# Patient Record
Sex: FEMALE | Race: WHITE | NOT HISPANIC OR LATINO | URBAN - METROPOLITAN AREA
[De-identification: names, ages, dates, MRNs, and addresses within clinical notes are randomized per-mention and may not be internally consistent; named-entity substitution may affect disease eponyms.]

---

## 2023-01-01 ENCOUNTER — NURSING HOME VISIT (OUTPATIENT)
Dept: POST ACUTE CARE | Facility: EXTERNAL LOCATION | Age: 78
End: 2023-01-01
Payer: MEDICARE

## 2023-01-01 DIAGNOSIS — S12.112D CLOSED NONDISPLACED ODONTOID FRACTURE WITH TYPE II MORPHOLOGY AND ROUTINE HEALING, SUBSEQUENT ENCOUNTER: ICD-10-CM

## 2023-01-01 DIAGNOSIS — R26.2 AMBULATORY DYSFUNCTION: ICD-10-CM

## 2023-01-01 DIAGNOSIS — I10 PRIMARY HYPERTENSION: ICD-10-CM

## 2023-01-01 DIAGNOSIS — I60.9 SUBARACHNOID HEMORRHAGE (MULTI): Primary | ICD-10-CM

## 2023-01-01 DIAGNOSIS — F10.10 ALCOHOL USE DISORDER, MILD, ABUSE: ICD-10-CM

## 2023-01-01 DIAGNOSIS — M62.838 MUSCLE SPASM: ICD-10-CM

## 2023-01-01 DIAGNOSIS — S12.100A: ICD-10-CM

## 2023-01-01 DIAGNOSIS — E43 SEVERE PROTEIN-CALORIE MALNUTRITION (MULTI): ICD-10-CM

## 2023-01-01 DIAGNOSIS — J44.1 COPD EXACERBATION (MULTI): ICD-10-CM

## 2023-01-01 DIAGNOSIS — F32.9 REACTIVE DEPRESSION: ICD-10-CM

## 2023-01-01 DIAGNOSIS — I10 HYPERTENSION, UNSPECIFIED TYPE: ICD-10-CM

## 2023-01-01 PROCEDURE — 99309 SBSQ NF CARE MODERATE MDM 30: CPT | Performed by: STUDENT IN AN ORGANIZED HEALTH CARE EDUCATION/TRAINING PROGRAM

## 2023-01-01 PROCEDURE — 99308 SBSQ NF CARE LOW MDM 20: CPT | Performed by: STUDENT IN AN ORGANIZED HEALTH CARE EDUCATION/TRAINING PROGRAM

## 2023-01-01 PROCEDURE — 99310 SBSQ NF CARE HIGH MDM 45: CPT | Performed by: STUDENT IN AN ORGANIZED HEALTH CARE EDUCATION/TRAINING PROGRAM

## 2023-01-01 PROCEDURE — 99306 1ST NF CARE HIGH MDM 50: CPT | Performed by: STUDENT IN AN ORGANIZED HEALTH CARE EDUCATION/TRAINING PROGRAM

## 2023-01-01 ASSESSMENT — ENCOUNTER SYMPTOMS
GASTROINTESTINAL NEGATIVE: 1
NEUROLOGICAL NEGATIVE: 1
CONSTITUTIONAL NEGATIVE: 1
RESPIRATORY NEGATIVE: 1
CONSTITUTIONAL NEGATIVE: 1
CARDIOVASCULAR NEGATIVE: 1
ARTHRALGIAS: 1
CARDIOVASCULAR NEGATIVE: 1
CARDIOVASCULAR NEGATIVE: 1
GASTROINTESTINAL NEGATIVE: 1
RESPIRATORY NEGATIVE: 1
NEUROLOGICAL NEGATIVE: 1
NEUROLOGICAL NEGATIVE: 1
CARDIOVASCULAR NEGATIVE: 1
PSYCHIATRIC NEGATIVE: 1
CARDIOVASCULAR NEGATIVE: 1
MUSCULOSKELETAL NEGATIVE: 1
PSYCHIATRIC NEGATIVE: 1
RESPIRATORY NEGATIVE: 1
NECK PAIN: 1
RESPIRATORY NEGATIVE: 1
CONSTITUTIONAL NEGATIVE: 1
GASTROINTESTINAL NEGATIVE: 1
NECK STIFFNESS: 1
RESPIRATORY NEGATIVE: 1
PSYCHIATRIC NEGATIVE: 1
NEUROLOGICAL NEGATIVE: 1
CARDIOVASCULAR NEGATIVE: 1
RESPIRATORY NEGATIVE: 1
NEUROLOGICAL NEGATIVE: 1
NEUROLOGICAL NEGATIVE: 1
CONSTITUTIONAL NEGATIVE: 1
GASTROINTESTINAL NEGATIVE: 1
CONSTITUTIONAL NEGATIVE: 1
GASTROINTESTINAL NEGATIVE: 1
RESPIRATORY NEGATIVE: 1
MUSCULOSKELETAL NEGATIVE: 1
CARDIOVASCULAR NEGATIVE: 1
MYALGIAS: 1
NEUROLOGICAL NEGATIVE: 1
NECK STIFFNESS: 1
PSYCHIATRIC NEGATIVE: 1
CARDIOVASCULAR NEGATIVE: 1
PSYCHIATRIC NEGATIVE: 1
CONSTITUTIONAL NEGATIVE: 1
MUSCULOSKELETAL NEGATIVE: 1
GASTROINTESTINAL NEGATIVE: 1
MUSCULOSKELETAL NEGATIVE: 1
GASTROINTESTINAL NEGATIVE: 1
BACK PAIN: 1
CONSTITUTIONAL NEGATIVE: 1
MYALGIAS: 1
MUSCULOSKELETAL NEGATIVE: 1
CONSTITUTIONAL NEGATIVE: 1
RESPIRATORY NEGATIVE: 1
MUSCULOSKELETAL NEGATIVE: 1
SLEEP DISTURBANCE: 1
PSYCHIATRIC NEGATIVE: 1
PSYCHIATRIC NEGATIVE: 1
NEUROLOGICAL NEGATIVE: 1
GASTROINTESTINAL NEGATIVE: 1

## 2023-03-06 PROBLEM — S12.100A C2 CERVICAL FRACTURE (MULTI): Status: ACTIVE | Noted: 2023-01-01

## 2023-03-06 PROBLEM — R26.2 AMBULATORY DYSFUNCTION: Status: ACTIVE | Noted: 2023-01-01

## 2023-03-06 PROBLEM — F10.10 ALCOHOL USE DISORDER, MILD, ABUSE: Status: ACTIVE | Noted: 2023-01-01

## 2023-03-06 PROBLEM — I10 HYPERTENSION: Status: ACTIVE | Noted: 2023-01-01

## 2023-03-06 PROBLEM — J44.1 COPD EXACERBATION (MULTI): Status: ACTIVE | Noted: 2023-01-01

## 2023-03-06 PROBLEM — I60.9 SUBARACHNOID HEMORRHAGE (MULTI): Status: ACTIVE | Noted: 2023-01-01

## 2023-03-06 NOTE — ASSESSMENT & PLAN NOTE
Recently admitted to the hospital, in cervical collar, overall stable encourage PT OT monitor neuro status has follow-up with neurosurgery

## 2023-03-06 NOTE — ASSESSMENT & PLAN NOTE
Recently admitted to trauma surgery, overall stable, follow-up with neurosurgery.  Continue PT OT monitor neurological exam

## 2023-03-06 NOTE — LETTER
Subjective  Patient ID: Lucía Elam is a 77 y.o. female with alcohol use disorder, hypertension, hyperlipidemia, seizure disorder who presents to [facility] for further rehabilitation and skilled therapy, patient recently admitted to the hospital status post fall, patient was noted to have a mechanical fall down 5-6 stairs.  Work-up showed a subarachnoid hemorrhage as well as comminuted type II odontoid fracture.  Patient was noted to be hemodynamically stable, did not require surgical intervention.  Patient deemed overall stable and was recommended discharge to skilled rehab facility, does have close follow-up with neurosurgery and specialist.  States overall is doing well at this time.  Working with therapy, is wondering about overall prognosis.  Denies any additional issues.    Review of Systems   Constitutional: Negative.    HENT: Negative.     Respiratory: Negative.     Cardiovascular: Negative.    Gastrointestinal: Negative.    Musculoskeletal: Negative.    Neurological: Negative.    Psychiatric/Behavioral: Negative.         Objective  Physical Exam  Constitutional:       General: She is not in acute distress.     Appearance: She is not ill-appearing.   HENT:      Mouth/Throat:      Mouth: Mucous membranes are moist.      Pharynx: Oropharynx is clear. No oropharyngeal exudate or posterior oropharyngeal erythema.   Eyes:      Pupils: Pupils are equal, round, and reactive to light.   Neck:      Comments: In C-collar, overall stable  Cardiovascular:      Rate and Rhythm: Normal rate and regular rhythm.      Heart sounds: No murmur heard.  Pulmonary:      Effort: Pulmonary effort is normal. No respiratory distress.      Breath sounds: No wheezing.   Abdominal:      General: Abdomen is flat. Bowel sounds are normal. There is no distension.      Palpations: Abdomen is soft.      Tenderness: There is no abdominal tenderness.   Musculoskeletal:         General: No tenderness. Normal range of motion.   Skin:      General: Skin is warm and dry.   Neurological:      General: No focal deficit present.      Mental Status: She is alert and oriented to person, place, and time. Mental status is at baseline.   Psychiatric:         Mood and Affect: Mood normal.         Assessment/Plan  Problem List Items Addressed This Visit          Nervous    Subarachnoid hemorrhage (CMS/HCC) - Primary     Recently admitted to trauma surgery, overall stable, follow-up with neurosurgery.  Continue PT OT monitor neurological exam            Circulatory    Hypertension       Musculoskeletal    C2 cervical fracture (CMS/HCC)     Recently admitted to the hospital, in cervical collar, overall stable encourage PT OT monitor neuro status has follow-up with neurosurgery            Other    Alcohol use disorder, mild, abuse     Was noted in the hospital, monitor for signs of withdrawals is on multivitamins.         Ambulatory dysfunction     PT OT encourage ambulation fall precautions.             Weekly labs encouraged follow-up with specialist, monitor, encourage PT OT monitor neuro status

## 2023-03-06 NOTE — PROGRESS NOTES
Subjective   Patient ID: Lucía Elam is a 77 y.o. female with alcohol use disorder, hypertension, hyperlipidemia, seizure disorder who presents to [place] for further rehabilitation and skilled therapy, patient recently admitted to the hospital status post fall, patient was noted to have a mechanical fall down 5-6 stairs.  Work-up showed a subarachnoid hemorrhage as well as comminuted type II odontoid fracture.  Patient was noted to be hemodynamically stable, did not require surgical intervention.  Patient deemed overall stable and was recommended discharge to skilled rehab facility, does have close follow-up with neurosurgery and specialist.  States overall is doing well at this time.  Working with therapy, is wondering about overall prognosis.  Denies any additional issues.    Review of Systems   Constitutional: Negative.    HENT: Negative.     Respiratory: Negative.     Cardiovascular: Negative.    Gastrointestinal: Negative.    Musculoskeletal: Negative.    Neurological: Negative.    Psychiatric/Behavioral: Negative.         Objective   Physical Exam  Constitutional:       General: She is not in acute distress.     Appearance: She is not ill-appearing.   HENT:      Mouth/Throat:      Mouth: Mucous membranes are moist.      Pharynx: Oropharynx is clear. No oropharyngeal exudate or posterior oropharyngeal erythema.   Eyes:      Pupils: Pupils are equal, round, and reactive to light.   Neck:      Comments: In C-collar, overall stable  Cardiovascular:      Rate and Rhythm: Normal rate and regular rhythm.      Heart sounds: No murmur heard.  Pulmonary:      Effort: Pulmonary effort is normal. No respiratory distress.      Breath sounds: No wheezing.   Abdominal:      General: Abdomen is flat. Bowel sounds are normal. There is no distension.      Palpations: Abdomen is soft.      Tenderness: There is no abdominal tenderness.   Musculoskeletal:         General: No tenderness. Normal range of motion.   Skin:      General: Skin is warm and dry.   Neurological:      General: No focal deficit present.      Mental Status: She is alert and oriented to person, place, and time. Mental status is at baseline.   Psychiatric:         Mood and Affect: Mood normal.         Assessment/Plan   Problem List Items Addressed This Visit          Nervous    Subarachnoid hemorrhage (CMS/HCC) - Primary     Recently admitted to trauma surgery, overall stable, follow-up with neurosurgery.  Continue PT OT monitor neurological exam            Circulatory    Hypertension       Musculoskeletal    C2 cervical fracture (CMS/HCC)     Recently admitted to the hospital, in cervical collar, overall stable encourage PT OT monitor neuro status has follow-up with neurosurgery            Other    Alcohol use disorder, mild, abuse     Was noted in the hospital, monitor for signs of withdrawals is on multivitamins.         Ambulatory dysfunction     PT OT encourage ambulation fall precautions.             Weekly labs encouraged follow-up with specialist, monitor, encourage PT OT monitor neuro status

## 2023-03-08 PROBLEM — F32.9 REACTIVE DEPRESSION: Status: ACTIVE | Noted: 2023-01-01

## 2023-03-08 NOTE — LETTER
Subjective  Patient ID: Lucía Elam is a 77 y.o. female who was seen on follow up. She regards that she is doing minimal movements with therapy. Has gotten out of bed into a chair. Is anxious, however, would be out of alochol withdrawal window. Is unsure about future. Regards no other complaints. Pain present in neck, however stable.      Review of Systems   Constitutional: Negative.    HENT: Negative.     Respiratory: Negative.     Cardiovascular: Negative.    Gastrointestinal: Negative.    Musculoskeletal: Negative.    Neurological: Negative.    Psychiatric/Behavioral: Negative.         Objective  Physical Exam  Constitutional:       General: She is not in acute distress.     Appearance: She is not ill-appearing.      Comments: In C collar   HENT:      Mouth/Throat:      Mouth: Mucous membranes are moist.      Pharynx: Oropharynx is clear. No oropharyngeal exudate or posterior oropharyngeal erythema.   Eyes:      Pupils: Pupils are equal, round, and reactive to light.   Cardiovascular:      Rate and Rhythm: Normal rate and regular rhythm.      Heart sounds: No murmur heard.  Pulmonary:      Effort: Pulmonary effort is normal. No respiratory distress.      Breath sounds: No wheezing.   Abdominal:      General: Abdomen is flat. Bowel sounds are normal. There is no distension.      Palpations: Abdomen is soft.      Tenderness: There is no abdominal tenderness.   Musculoskeletal:         General: No tenderness. Normal range of motion.   Skin:     General: Skin is warm and dry.   Neurological:      General: No focal deficit present.      Mental Status: She is alert and oriented to person, place, and time. Mental status is at baseline.   Psychiatric:      Comments: Dec mood, flat affect         Assessment/Plan  Problem List Items Addressed This Visit          Nervous    Subarachnoid hemorrhage (CMS/HCC) - Primary       Musculoskeletal    C2 cervical fracture (CMS/HCC)       Other    Alcohol use disorder, mild, abuse     Ambulatory dysfunction    Reactive depression     Suspected, noticed dec mood, inc SSRI, monitor symptoms consider psych eval             Monitor mentation, fu on labs, encourage continue PT/OT, staff updated

## 2023-03-15 PROBLEM — M62.838 MUSCLE SPASM: Status: ACTIVE | Noted: 2023-01-01

## 2023-03-15 NOTE — LETTER
Patient: Lucía Elam  : 1945    Encounter Date: 03/15/2023    Subjective  Patient ID: Lucía Elam is a 77 y.o. female.    Pt seen resting in bed. Mood has been down, she reports that she is working with therapy but is still in some pain. Does not have much of an appetite. Pain is stable but is intermittently poor. Regards no additional issues.         Review of Systems   Constitutional: Negative.    HENT: Negative.     Respiratory: Negative.     Cardiovascular: Negative.    Gastrointestinal: Negative.    Musculoskeletal:  Positive for arthralgias, myalgias, neck pain and neck stiffness.   Neurological: Negative.    Psychiatric/Behavioral: Negative.         Objective vitals reviewed via EMR  Physical Exam  Vitals and nursing note reviewed.   Constitutional:       General: She is not in acute distress.     Appearance: She is not ill-appearing.      Comments: In C collar   HENT:      Mouth/Throat:      Mouth: Mucous membranes are moist.      Pharynx: Oropharynx is clear. No oropharyngeal exudate or posterior oropharyngeal erythema.   Eyes:      Pupils: Pupils are equal, round, and reactive to light.   Cardiovascular:      Rate and Rhythm: Normal rate and regular rhythm.      Heart sounds: No murmur heard.  Pulmonary:      Effort: Pulmonary effort is normal. No respiratory distress.      Breath sounds: No wheezing.   Abdominal:      General: Abdomen is flat. Bowel sounds are normal. There is no distension.      Palpations: Abdomen is soft.      Tenderness: There is no abdominal tenderness.   Musculoskeletal:         General: Normal range of motion.      Cervical back: Tenderness present.   Skin:     General: Skin is warm and dry.   Neurological:      General: No focal deficit present.      Mental Status: She is alert and oriented to person, place, and time. Mental status is at baseline.   Psychiatric:         Mood and Affect: Mood normal.         Assessment/Plan  Diagnoses and all orders for this  visit:  Subarachnoid hemorrhage (CMS/[facility])  Hypertension, unspecified type  Closed nondisplaced odontoid fracture with type II morphology and routine healing, subsequent encounter  Alcohol use disorder, mild, abuse  Ambulatory dysfunction  Reactive depression  Muscle spasm      Pt seen and evaluated, has been having poor appetite, weight is already poor. Mood has also been decreased since admission. Will consult psych, pt already on multiple mood modulator medications. Dietician eval, monitor weight. Called family for update, however, message was left, will continue to follow up. May consider muscle relaxers or stronger pain medications, however pt is poor candidate with history of alcohol use. Supportive care, weekly labs    >30 minutes spent in care of patient    Neeraj Mena DO  3/15/23      Electronically Signed By: Neeraj Mena DO   3/15/23  9:22 PM

## 2023-03-16 NOTE — PROGRESS NOTES
Subjective   Patient ID: Lucía Elam is a 77 y.o. female.    Pt seen resting in bed. Mood has been down, she reports that she is working with therapy but is still in some pain. Does not have much of an appetite. Pain is stable but is intermittently poor. Regards no additional issues.         Review of Systems   Constitutional: Negative.    HENT: Negative.     Respiratory: Negative.     Cardiovascular: Negative.    Gastrointestinal: Negative.    Musculoskeletal:  Positive for arthralgias, myalgias, neck pain and neck stiffness.   Neurological: Negative.    Psychiatric/Behavioral: Negative.         Objective  vitals reviewed via EMR  Physical Exam  Vitals and nursing note reviewed.   Constitutional:       General: She is not in acute distress.     Appearance: She is not ill-appearing.      Comments: In C collar   HENT:      Mouth/Throat:      Mouth: Mucous membranes are moist.      Pharynx: Oropharynx is clear. No oropharyngeal exudate or posterior oropharyngeal erythema.   Eyes:      Pupils: Pupils are equal, round, and reactive to light.   Cardiovascular:      Rate and Rhythm: Normal rate and regular rhythm.      Heart sounds: No murmur heard.  Pulmonary:      Effort: Pulmonary effort is normal. No respiratory distress.      Breath sounds: No wheezing.   Abdominal:      General: Abdomen is flat. Bowel sounds are normal. There is no distension.      Palpations: Abdomen is soft.      Tenderness: There is no abdominal tenderness.   Musculoskeletal:         General: Normal range of motion.      Cervical back: Tenderness present.   Skin:     General: Skin is warm and dry.   Neurological:      General: No focal deficit present.      Mental Status: She is alert and oriented to person, place, and time. Mental status is at baseline.   Psychiatric:         Mood and Affect: Mood normal.         Assessment/Plan   Diagnoses and all orders for this visit:  Subarachnoid hemorrhage (CMS/[facility])  Hypertension, unspecified  type  Closed nondisplaced odontoid fracture with type II morphology and routine healing, subsequent encounter  Alcohol use disorder, mild, abuse  Ambulatory dysfunction  Reactive depression  Muscle spasm      Pt seen and evaluated, has been having poor appetite, weight is already poor. Mood has also been decreased since admission. Will consult psych, pt already on multiple mood modulator medications. Dietician eval, monitor weight. Called family for update, however, message was left, will continue to follow up. May consider muscle relaxers or stronger pain medications, however pt is poor candidate with history of alcohol use. Supportive care, weekly labs    >30 minutes spent in care of patient    Neeraj Mena DO  3/15/23

## 2023-03-17 NOTE — LETTER
Patient: Lucía Elam  : 1945    Encounter Date: 2023    Subjective  Patient ID: Lucía Elam is a 77 y.o. female.    Patient seen on routine evaluation. Continues to have a poor appetitie, she reports that she is having muscle spasms and difficult with the C-collar. Would like to go home. Discussed with nutrition about weight loss and poor eating habits. Pt regards no additional issues, working with therapy.         Review of Systems   Constitutional: Negative.    HENT: Negative.          Neck pain, and muscle spasms   Respiratory: Negative.     Cardiovascular: Negative.    Gastrointestinal: Negative.    Musculoskeletal: Negative.    Neurological: Negative.    Psychiatric/Behavioral: Negative.         Objective  Physical Exam  Constitutional:       General: She is not in acute distress.     Appearance: She is not ill-appearing.   HENT:      Mouth/Throat:      Mouth: Mucous membranes are moist.      Pharynx: Oropharynx is clear. No oropharyngeal exudate or posterior oropharyngeal erythema.   Eyes:      Pupils: Pupils are equal, round, and reactive to light.   Cardiovascular:      Rate and Rhythm: Normal rate and regular rhythm.      Heart sounds: No murmur heard.  Pulmonary:      Effort: Pulmonary effort is normal. No respiratory distress.      Breath sounds: No wheezing.   Abdominal:      General: Abdomen is flat. Bowel sounds are normal. There is no distension.      Palpations: Abdomen is soft.      Tenderness: There is no abdominal tenderness.   Musculoskeletal:         General: No tenderness. Normal range of motion.   Skin:     General: Skin is warm and dry.   Neurological:      General: No focal deficit present.      Mental Status: She is alert and oriented to person, place, and time. Mental status is at baseline.   Psychiatric:      Comments: Flat affect, dec mood         Assessment/Plan  Diagnoses and all orders for this visit:  Subarachnoid hemorrhage (CMS/[facility])  Primary hypertension  Closed  nondisplaced odontoid fracture with type II morphology and routine healing, subsequent encounter  Muscle spasm  Alcohol use disorder, mild, abuse  Reactive depression    Patient seen and evaluated, overall poor affect, encourage nutrition supplements, consult to psych, potentially can wean off Wellbutrin and initiate Remeron. Conisder palliative consult pending clinical course, routine labs, supportive care    Neeraj Mena DO  3/17/23        Electronically Signed By: Neeraj Mena DO   3/17/23  9:25 PM

## 2023-03-18 NOTE — PROGRESS NOTES
Subjective   Patient ID: Lucía Elam is a 77 y.o. female.    Patient seen on routine evaluation. Continues to have a poor appetitie, she reports that she is having muscle spasms and difficult with the C-collar. Would like to go home. Discussed with nutrition about weight loss and poor eating habits. Pt regards no additional issues, working with therapy.         Review of Systems   Constitutional: Negative.    HENT: Negative.          Neck pain, and muscle spasms   Respiratory: Negative.     Cardiovascular: Negative.    Gastrointestinal: Negative.    Musculoskeletal: Negative.    Neurological: Negative.    Psychiatric/Behavioral: Negative.         Objective   Physical Exam  Constitutional:       General: She is not in acute distress.     Appearance: She is not ill-appearing.   HENT:      Mouth/Throat:      Mouth: Mucous membranes are moist.      Pharynx: Oropharynx is clear. No oropharyngeal exudate or posterior oropharyngeal erythema.   Eyes:      Pupils: Pupils are equal, round, and reactive to light.   Cardiovascular:      Rate and Rhythm: Normal rate and regular rhythm.      Heart sounds: No murmur heard.  Pulmonary:      Effort: Pulmonary effort is normal. No respiratory distress.      Breath sounds: No wheezing.   Abdominal:      General: Abdomen is flat. Bowel sounds are normal. There is no distension.      Palpations: Abdomen is soft.      Tenderness: There is no abdominal tenderness.   Musculoskeletal:         General: No tenderness. Normal range of motion.   Skin:     General: Skin is warm and dry.   Neurological:      General: No focal deficit present.      Mental Status: She is alert and oriented to person, place, and time. Mental status is at baseline.   Psychiatric:      Comments: Flat affect, dec mood         Assessment/Plan   Diagnoses and all orders for this visit:  Subarachnoid hemorrhage (CMS/[facility])  Primary hypertension  Closed nondisplaced odontoid fracture with type II morphology and routine  healing, subsequent encounter  Muscle spasm  Alcohol use disorder, mild, abuse  Reactive depression    Patient seen and evaluated, overall poor affect, encourage nutrition supplements, consult to psych, potentially can wean off Wellbutrin and initiate Remeron. Moe palliative consult pending clinical course, routine labs, supportive care    Neeraj Mena DO  3/17/23

## 2023-03-20 NOTE — LETTER
Patient: Lucía Elam  : 1945    Encounter Date: 2023    Daily Progress Note    Subjective  Patient ID: Lucía Elam is a 77 y.o. female who was seen for further evaluation. Feels as if her pain has improved tremendously with the initiation of low dose muscle relaxers. States that she would like to go home. Still weak and is losing weight. Needs a referral to a new PCP as well. Did have a witnessed mechanical fall today where she did hit her head. C collar in place at time of fall, neurochecks normal, however, did hit her head. Discussed with staff and plan to send to hospital for further evaluation.     Review of Systems   Constitutional: Negative.    HENT: Negative.     Respiratory: Negative.     Cardiovascular: Negative.    Gastrointestinal: Negative.    Musculoskeletal:  Positive for back pain, myalgias and neck stiffness.        Fall   Neurological: Negative.    Psychiatric/Behavioral: Negative.         Objective vitals reviewed via facility EMR  Physical Exam  Constitutional:       General: She is not in acute distress.     Appearance: She is not ill-appearing.   HENT:      Mouth/Throat:      Mouth: Mucous membranes are moist.      Pharynx: Oropharynx is clear. No oropharyngeal exudate or posterior oropharyngeal erythema.   Eyes:      Pupils: Pupils are equal, round, and reactive to light.   Cardiovascular:      Rate and Rhythm: Normal rate and regular rhythm.      Heart sounds: No murmur heard.  Pulmonary:      Effort: Pulmonary effort is normal. No respiratory distress.      Breath sounds: No wheezing.   Abdominal:      General: Abdomen is flat. Bowel sounds are normal. There is no distension.      Palpations: Abdomen is soft.      Tenderness: There is no abdominal tenderness.   Musculoskeletal:         General: No tenderness. Normal range of motion.   Skin:     General: Skin is warm and dry.   Neurological:      General: No focal deficit present.      Mental Status: She is alert and oriented  to person, place, and time. Mental status is at baseline.   Psychiatric:         Mood and Affect: Mood normal.         Assessment/Plan  Problem List Items Addressed This Visit          Nervous    Subarachnoid hemorrhage (CMS/HCC) - Primary       Circulatory    Hypertension       Musculoskeletal    C2 cervical fracture (CMS/HCC)    Muscle spasm       Other    Alcohol use disorder, mild, abuse    Ambulatory dysfunction    Reactive depression        Encourage continue PT/OT, continue pain control. Still is weak, advised continued PT/OT as patient would benefit from further care in a skilled facility. Awaiting psych eval due to weight loss. Patient noted to have witnessed fall where she hit her head. Did not lose consciousness. Due to recent neck fracture, will send patient to ED for further evaluation. Staff and patient updated    >40 minutes spent in management of patient  Neeraj Mena DO  3/20/23      Electronically Signed By: Neeraj Mena DO   3/20/23 11:22 PM

## 2023-03-21 NOTE — PROGRESS NOTES
Daily Progress Note    Subjective   Patient ID: Lucía Elam is a 77 y.o. female who was seen for further evaluation. Feels as if her pain has improved tremendously with the initiation of low dose muscle relaxers. States that she would like to go home. Still weak and is losing weight. Needs a referral to a new PCP as well. Did have a witnessed mechanical fall today where she did hit her head. C collar in place at time of fall, neurochecks normal, however, did hit her head. Discussed with staff and plan to send to hospital for further evaluation.     Review of Systems   Constitutional: Negative.    HENT: Negative.     Respiratory: Negative.     Cardiovascular: Negative.    Gastrointestinal: Negative.    Musculoskeletal:  Positive for back pain, myalgias and neck stiffness.        Fall   Neurological: Negative.    Psychiatric/Behavioral: Negative.         Objective  vitals reviewed via facility EMR  Physical Exam  Constitutional:       General: She is not in acute distress.     Appearance: She is not ill-appearing.   HENT:      Mouth/Throat:      Mouth: Mucous membranes are moist.      Pharynx: Oropharynx is clear. No oropharyngeal exudate or posterior oropharyngeal erythema.   Eyes:      Pupils: Pupils are equal, round, and reactive to light.   Cardiovascular:      Rate and Rhythm: Normal rate and regular rhythm.      Heart sounds: No murmur heard.  Pulmonary:      Effort: Pulmonary effort is normal. No respiratory distress.      Breath sounds: No wheezing.   Abdominal:      General: Abdomen is flat. Bowel sounds are normal. There is no distension.      Palpations: Abdomen is soft.      Tenderness: There is no abdominal tenderness.   Musculoskeletal:         General: No tenderness. Normal range of motion.   Skin:     General: Skin is warm and dry.   Neurological:      General: No focal deficit present.      Mental Status: She is alert and oriented to person, place, and time. Mental status is at baseline.    Psychiatric:         Mood and Affect: Mood normal.         Assessment/Plan   Problem List Items Addressed This Visit          Nervous    Subarachnoid hemorrhage (CMS/HCC) - Primary       Circulatory    Hypertension       Musculoskeletal    C2 cervical fracture (CMS/HCC)    Muscle spasm       Other    Alcohol use disorder, mild, abuse    Ambulatory dysfunction    Reactive depression        Encourage continue PT/OT, continue pain control. Still is weak, advised continued PT/OT as patient would benefit from further care in a skilled facility. Awaiting psych eval due to weight loss. Patient noted to have witnessed fall where she hit her head. Did not lose consciousness. Due to recent neck fracture, will send patient to ED for further evaluation. Staff and patient updated    >40 minutes spent in management of patient  Neeraj Mena DO  3/20/23

## 2023-03-22 NOTE — LETTER
Patient: Lucía Elam  : 1945    Encounter Date: 2023    Subjective  Patient ID: Lucía Elam is a 77 y.o. female.    Pt seen resting in bed. Overall stable, pain still well controlled. Workup up fall was negative. She reports that she would like to go home if possible. Still has flat affect however is on multiple medications to help modulate mood. Lexapro has been increased during stay. Encourage continued PT/OT. Discussed with staff.         Review of Systems   Constitutional: Negative.    HENT: Negative.     Respiratory: Negative.     Cardiovascular: Negative.    Gastrointestinal: Negative.    Musculoskeletal: Negative.    Neurological: Negative.    Psychiatric/Behavioral:  Positive for sleep disturbance.         Dec mood       ObjectiveVitals Reviewed via facility EMR   Physical Exam  Constitutional:       General: She is not in acute distress.     Appearance: She is not ill-appearing.      Comments: Flat affect, lies in bed all day   HENT:      Mouth/Throat:      Mouth: Mucous membranes are moist.      Pharynx: Oropharynx is clear. No oropharyngeal exudate or posterior oropharyngeal erythema.   Eyes:      Pupils: Pupils are equal, round, and reactive to light.   Cardiovascular:      Rate and Rhythm: Normal rate and regular rhythm.      Heart sounds: No murmur heard.  Pulmonary:      Effort: Pulmonary effort is normal. No respiratory distress.      Breath sounds: No wheezing.   Abdominal:      General: Abdomen is flat. Bowel sounds are normal. There is no distension.      Palpations: Abdomen is soft.      Tenderness: There is no abdominal tenderness.   Musculoskeletal:         General: No tenderness. Normal range of motion.   Skin:     General: Skin is warm and dry.   Neurological:      General: No focal deficit present.      Mental Status: She is alert and oriented to person, place, and time. Mental status is at baseline.   Psychiatric:      Comments: Flat, dec mood, poor appetite          Assessment/Plan  Diagnoses and all orders for this visit:  Subarachnoid hemorrhage (CMS/HCC)  Primary hypertension  Closed nondisplaced odontoid fracture with type II morphology and routine healing, subsequent encounter  Muscle spasm  Alcohol use disorder, mild, abuse  Ambulatory dysfunction  Reactive depression    Continue to encourage supplements and working with therapy. Discussed with staff that she would benefit from psych eval while in facility. Check Mag and Phos at next labs due to alcohol use. Workup negative from fall, continue supportive care and fall precuations.    Neeraj Mena DO   3/22/23      Electronically Signed By: Neeraj Mena DO   3/23/23  8:40 PM

## 2023-03-23 PROBLEM — E43 SEVERE PROTEIN-CALORIE MALNUTRITION (MULTI): Status: ACTIVE | Noted: 2023-01-01

## 2023-03-24 NOTE — PROGRESS NOTES
Subjective   Patient ID: Lucía Elam is a 77 y.o. female.    Pt seen resting in bed. Overall stable, pain still well controlled. Workup up fall was negative. She reports that she would like to go home if possible. Still has flat affect however is on multiple medications to help modulate mood. Lexapro has been increased during stay. Encourage continued PT/OT. Discussed with staff.         Review of Systems   Constitutional: Negative.    HENT: Negative.     Respiratory: Negative.     Cardiovascular: Negative.    Gastrointestinal: Negative.    Musculoskeletal: Negative.    Neurological: Negative.    Psychiatric/Behavioral:  Positive for sleep disturbance.         Dec mood       Objective Vitals Reviewed via facility EMR   Physical Exam  Constitutional:       General: She is not in acute distress.     Appearance: She is not ill-appearing.      Comments: Flat affect, lies in bed all day   HENT:      Mouth/Throat:      Mouth: Mucous membranes are moist.      Pharynx: Oropharynx is clear. No oropharyngeal exudate or posterior oropharyngeal erythema.   Eyes:      Pupils: Pupils are equal, round, and reactive to light.   Cardiovascular:      Rate and Rhythm: Normal rate and regular rhythm.      Heart sounds: No murmur heard.  Pulmonary:      Effort: Pulmonary effort is normal. No respiratory distress.      Breath sounds: No wheezing.   Abdominal:      General: Abdomen is flat. Bowel sounds are normal. There is no distension.      Palpations: Abdomen is soft.      Tenderness: There is no abdominal tenderness.   Musculoskeletal:         General: No tenderness. Normal range of motion.   Skin:     General: Skin is warm and dry.   Neurological:      General: No focal deficit present.      Mental Status: She is alert and oriented to person, place, and time. Mental status is at baseline.   Psychiatric:      Comments: Flat, dec mood, poor appetite         Assessment/Plan   Diagnoses and all orders for this visit:  Subarachnoid  hemorrhage (CMS/HCC)  Primary hypertension  Closed nondisplaced odontoid fracture with type II morphology and routine healing, subsequent encounter  Muscle spasm  Alcohol use disorder, mild, abuse  Ambulatory dysfunction  Reactive depression    Continue to encourage supplements and working with therapy. Discussed with staff that she would benefit from psych eval while in facility. Check Mag and Phos at next labs due to alcohol use. Workup negative from fall, continue supportive care and fall precuations.    Neeraj Mena DO   3/22/23

## 2023-03-28 NOTE — LETTER
Patient: Lucía Elam  : 1945    Encounter Date: 2023    History and Physical    Subjective  Patient ID: Lucía Elam is a 77 y.o. female.    Patient is a 77-year-old female who presents as a readmission to a skilled nursing facility.  Past medical history includes hypertension, hyperlipidemia, history of alcohol use, anxiety, tobacco abuse.  Patient recently had an unwitnessed fall.  He was sent to the hospital for further evaluation due to known knowledge of subarachnoid hemorrhage as well as C2 fracture.  Patient C2 fracture overall stable however did have redemonstration of subarachnoid hemorrhage.  Patient was monitored closely in the hospital, was seen by neurosurgery did not require a surgical intervention.  Patient was discharged back to SNF in stable condition.  Patient reports that overall she is stable however still is having some neck pain.  Appetite is still poor.  She denies any additional issues or concerns.  On review of the chart, patient is still full code. No addititional issues        Review of Systems   Constitutional: Negative.    HENT: Negative.     Respiratory: Negative.     Cardiovascular: Negative.    Gastrointestinal: Negative.    Musculoskeletal: Negative.    Neurological: Negative.         Neck pain, neuriopathy   Psychiatric/Behavioral: Negative.         Objective  Physical Exam  Constitutional:       General: She is not in acute distress.     Appearance: She is not ill-appearing.      Comments: C-collar   HENT:      Mouth/Throat:      Mouth: Mucous membranes are moist.      Pharynx: Oropharynx is clear. No oropharyngeal exudate or posterior oropharyngeal erythema.   Eyes:      Pupils: Pupils are equal, round, and reactive to light.   Cardiovascular:      Rate and Rhythm: Normal rate and regular rhythm.      Heart sounds: No murmur heard.  Pulmonary:      Effort: Pulmonary effort is normal. No respiratory distress.      Breath sounds: No wheezing.   Abdominal:       General: Abdomen is flat. Bowel sounds are normal. There is no distension.      Palpations: Abdomen is soft.      Tenderness: There is no abdominal tenderness.   Musculoskeletal:         General: No tenderness. Normal range of motion.   Skin:     General: Skin is warm and dry.   Neurological:      General: No focal deficit present.      Mental Status: She is alert and oriented to person, place, and time. Mental status is at baseline.   Psychiatric:         Mood and Affect: Mood normal.      Comments: Flat affect         Assessment/Plan  Diagnoses and all orders for this visit:  Subarachnoid hemorrhage (CMS/HCC)  COPD exacerbation (CMS/HCC)  Primary hypertension  Closed nondisplaced odontoid fracture with type II morphology and routine healing, subsequent encounter  Severe protein-calorie malnutrition (CMS/HCC)  Muscle spasm  Alcohol use disorder, mild, abuse  Reactive depression  Ambulatory dysfunction    Patient seen and evaluated, hospital course reviewed.  Patient did have a redemonstration of some arachnoid hemorrhage Did not require any surgical intervention.  PT OT, and frequent falls will need to be very judicious with pain control.  We will need to closely monitor progression outpatient as she has had very poor progression with therapy.  Continue supportive care.  Weekly labs.  Fall precautions while in facility.  Head of bed elevation.    >45 minutes spent in management of patient    Neeraj Mena DO      Electronically Signed By: Neeraj Mena DO   3/29/23  9:04 PM

## 2023-03-30 NOTE — LETTER
Patient: Lucía Ealm  : 1945    Encounter Date: 2023    Subjective  Patient ID: Lucía Elam is a 77 y.o. female.    Patient seen on routine evaluation. Overall doing well, neck pain is much improved and she has not fallen since being back at the facility. She feels as if her strength is back at baseline however, has started noticing that her mentation is not to the level where it was at previously. Feels as if this started secondary to the injury. She regards no additional issues at this time.         Review of Systems   Constitutional: Negative.    HENT: Negative.     Respiratory: Negative.     Cardiovascular: Negative.    Gastrointestinal: Negative.    Musculoskeletal: Negative.    Neurological: Negative.         Forgetful   Psychiatric/Behavioral: Negative.         ObjectiveVitals Reviewed via facility EMR   Physical Exam  Constitutional:       General: She is not in acute distress.     Comments: Malnourished, elderly, in c collar   HENT:      Mouth/Throat:      Mouth: Mucous membranes are moist.      Pharynx: Oropharynx is clear. No oropharyngeal exudate or posterior oropharyngeal erythema.   Eyes:      Pupils: Pupils are equal, round, and reactive to light.   Cardiovascular:      Rate and Rhythm: Normal rate and regular rhythm.      Heart sounds: No murmur heard.  Pulmonary:      Effort: Pulmonary effort is normal. No respiratory distress.      Breath sounds: No wheezing.   Abdominal:      General: Abdomen is flat. There is no distension.   Musculoskeletal:         General: No tenderness. Normal range of motion.   Skin:     General: Skin is warm and dry.   Neurological:      General: No focal deficit present.      Mental Status: She is alert and oriented to person, place, and time.   Psychiatric:         Mood and Affect: Mood normal.         Assessment/Plan  Diagnoses and all orders for this visit:  Subarachnoid hemorrhage (CMS/[facility])  Primary hypertension  Closed nondisplaced odontoid fracture  with type II morphology and routine healing, subsequent encounter  Muscle spasm  Severe protein-calorie malnutrition (CMS/HCC)  Alcohol use disorder, mild, abuse  Ambulatory dysfunction      Patient seen and evaluated, physically patient is overall doing well and likely close to discharge back home with [facility] however still struggling mentally. Suspect that this is secondary to underlying sequela of injury. Continue supportive care, will need fu with neuro as outpatient. Avoid sedating meds, no additional issues.       Electronically Signed By: Neeraj Mena DO   3/31/23  2:27 PM

## 2023-03-30 NOTE — PROGRESS NOTES
History and Physical    Subjective   Patient ID: Lucía Elam is a 77 y.o. female.    Patient is a 77-year-old female who presents as a readmission to a skilled nursing facility.  Past medical history includes hypertension, hyperlipidemia, history of alcohol use, anxiety, tobacco abuse.  Patient recently had an unwitnessed fall.  He was sent to the hospital for further evaluation due to known knowledge of subarachnoid hemorrhage as well as C2 fracture.  Patient C2 fracture overall stable however did have redemonstration of subarachnoid hemorrhage.  Patient was monitored closely in the hospital, was seen by neurosurgery did not require a surgical intervention.  Patient was discharged back to SNF in stable condition.  Patient reports that overall she is stable however still is having some neck pain.  Appetite is still poor.  She denies any additional issues or concerns.  On review of the chart, patient is still full code. No addititional issues        Review of Systems   Constitutional: Negative.    HENT: Negative.     Respiratory: Negative.     Cardiovascular: Negative.    Gastrointestinal: Negative.    Musculoskeletal: Negative.    Neurological: Negative.         Neck pain, neuriopathy   Psychiatric/Behavioral: Negative.         Objective   Physical Exam  Constitutional:       General: She is not in acute distress.     Appearance: She is not ill-appearing.      Comments: C-collar   HENT:      Mouth/Throat:      Mouth: Mucous membranes are moist.      Pharynx: Oropharynx is clear. No oropharyngeal exudate or posterior oropharyngeal erythema.   Eyes:      Pupils: Pupils are equal, round, and reactive to light.   Cardiovascular:      Rate and Rhythm: Normal rate and regular rhythm.      Heart sounds: No murmur heard.  Pulmonary:      Effort: Pulmonary effort is normal. No respiratory distress.      Breath sounds: No wheezing.   Abdominal:      General: Abdomen is flat. Bowel sounds are normal. There is no distension.       Palpations: Abdomen is soft.      Tenderness: There is no abdominal tenderness.   Musculoskeletal:         General: No tenderness. Normal range of motion.   Skin:     General: Skin is warm and dry.   Neurological:      General: No focal deficit present.      Mental Status: She is alert and oriented to person, place, and time. Mental status is at baseline.   Psychiatric:         Mood and Affect: Mood normal.      Comments: Flat affect         Assessment/Plan   Diagnoses and all orders for this visit:  Subarachnoid hemorrhage (CMS/HCC)  COPD exacerbation (CMS/HCC)  Primary hypertension  Closed nondisplaced odontoid fracture with type II morphology and routine healing, subsequent encounter  Severe protein-calorie malnutrition (CMS/HCC)  Muscle spasm  Alcohol use disorder, mild, abuse  Reactive depression  Ambulatory dysfunction    Patient seen and evaluated, hospital course reviewed.  Patient did have a redemonstration of some arachnoid hemorrhage Did not require any surgical intervention.  PT OT, and frequent falls will need to be very judicious with pain control.  We will need to closely monitor progression outpatient as she has had very poor progression with therapy.  Continue supportive care.  Weekly labs.  Fall precautions while in facility.  Head of bed elevation.    >45 minutes spent in management of patient    Neeraj Mena DO

## 2023-03-31 NOTE — PROGRESS NOTES
Subjective   Patient ID: Lucía Elam is a 77 y.o. female.    Patient seen on routine evaluation. Overall doing well, neck pain is much improved and she has not fallen since being back at the facility. She feels as if her strength is back at baseline however, has started noticing that her mentation is not to the level where it was at previously. Feels as if this started secondary to the injury. She regards no additional issues at this time.         Review of Systems   Constitutional: Negative.    HENT: Negative.     Respiratory: Negative.     Cardiovascular: Negative.    Gastrointestinal: Negative.    Musculoskeletal: Negative.    Neurological: Negative.         Forgetful   Psychiatric/Behavioral: Negative.         Objective Vitals Reviewed via facility EMR   Physical Exam  Constitutional:       General: She is not in acute distress.     Comments: Malnourished, elderly, in c collar   HENT:      Mouth/Throat:      Mouth: Mucous membranes are moist.      Pharynx: Oropharynx is clear. No oropharyngeal exudate or posterior oropharyngeal erythema.   Eyes:      Pupils: Pupils are equal, round, and reactive to light.   Cardiovascular:      Rate and Rhythm: Normal rate and regular rhythm.      Heart sounds: No murmur heard.  Pulmonary:      Effort: Pulmonary effort is normal. No respiratory distress.      Breath sounds: No wheezing.   Abdominal:      General: Abdomen is flat. There is no distension.   Musculoskeletal:         General: No tenderness. Normal range of motion.   Skin:     General: Skin is warm and dry.   Neurological:      General: No focal deficit present.      Mental Status: She is alert and oriented to person, place, and time.   Psychiatric:         Mood and Affect: Mood normal.         Assessment/Plan   Diagnoses and all orders for this visit:  Subarachnoid hemorrhage (CMS/HCC)  Primary hypertension  Closed nondisplaced odontoid fracture with type II morphology and routine healing, subsequent  encounter  Muscle spasm  Severe protein-calorie malnutrition (CMS/HCC)  Alcohol use disorder, mild, abuse  Ambulatory dysfunction      Patient seen and evaluated, physically patient is overall doing well and likely close to discharge back home with [facility] however still struggling mentally. Suspect that this is secondary to underlying sequela of injury. Continue supportive care, will need fu with neuro as outpatient. Avoid sedating meds, no additional issues.